# Patient Record
(demographics unavailable — no encounter records)

---

## 2025-03-04 NOTE — HISTORY OF PRESENT ILLNESS
[FreeTextEntry1] : Patient presents for comprehensive medical evaluation today.  [de-identified] : Several concerns today -   Has been having electrical shock in R side of head for 2+ years now - happens once monthly now  Only trigger she is able to identify is stress?  Lasts for a second or so and then immediately goes away No associated migraines, vision changes - neurologically otherwise okay   Worried as she has extensive family hx of cancer - sister with GBM, mother with unknown cancer, cousin thyroid cancer, brother ?liver cancer Reports that several years ago had thyroid nodule? for which FNA was performed - would like to follow-up on this   Has been having aches and pains - works as a

## 2025-03-04 NOTE — ASSESSMENT
[FreeTextEntry1] : EKG obtained to assist in diagnosis and management of assessed problem(s).    EKG shows sinus rhythm without acute ischemic changes.  #H pylori  s/p treatment eradication  #Neurologic symptoms  reports intermittent numbness/tingling in head - concerned as it has continued for 2+ years  no other focal deficits noted [ ] CT head negative  [ ] CT angio/head neck ordered to evaluate vasculature and r/o aneurysm   #HLD  lifestyle modification discussed at length repeat lipid profile 3 months calcium score negative as of 2023  [ ] CT chest re: pulmonary nodule evaluation - seen on calcium score 9/2023 [ ] mammogram + US  [ ] repeat TSH - ?subclinical hypothyroidism  [ ] patient requests vitamins to be checked with next lab draw  
Syncope and collapse

## 2025-03-04 NOTE — HEALTH RISK ASSESSMENT
[No] : In the past 12 months have you used drugs other than those required for medical reasons? No [No falls in past year] : Patient reported no falls in the past year [0] : 2) Feeling down, depressed, or hopeless: Not at all (0) [PHQ-2 Negative - No further assessment needed] : PHQ-2 Negative - No further assessment needed [Never] : Never [Patient reported colonoscopy was normal] : Patient reported colonoscopy was normal [] :  [# Of Children ___] : has [unfilled] children [Feels Safe at Home] : Feels safe at home [Fully functional (bathing, dressing, toileting, transferring, walking, feeding)] : Fully functional (bathing, dressing, toileting, transferring, walking, feeding) [Fully functional (using the telephone, shopping, preparing meals, housekeeping, doing laundry, using] : Fully functional and needs no help or supervision to perform IADLs (using the telephone, shopping, preparing meals, housekeeping, doing laundry, using transportation, managing medications and managing finances) [Patient reported mammogram was normal] : Patient reported mammogram was normal [Patient reported PAP Smear was normal] : Patient reported PAP Smear was normal [Patient reported bone density results were normal] : Patient reported bone density results were normal [Time Spent: ___ Minutes] : I spent [unfilled] minutes performing a depression screening for this patient. [de-identified] : inconsistent  [de-identified] : balanced varied diet without restrictions  [[id number]] : 0 [Change in mental status noted] : No change in mental status noted [Language] : denies difficulty with language [Handling Complex Tasks] : denies difficulty handling complex tasks [High Risk Behavior] : no high risk behavior [Reports changes in hearing] : Reports no changes in hearing [Reports changes in vision] : Reports no changes in vision [MammogramDate] : 4/2023 [MammogramComments] : referral  [PapSmearDate] : 4/2023 [PapSmearComments] : referral  [BoneDensityDate] : 10/2023 [BoneDensityComments] : 10/2028 [ColonoscopyDate] : 4/2021 [ColonoscopyComments] : 4/2031 prefers 5 years  [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